# Patient Record
(demographics unavailable — no encounter records)

---

## 2024-11-16 NOTE — HISTORY OF PRESENT ILLNESS
[Home] : at home, [unfilled] , at the time of the visit. [Other Location: e.g. Home (Enter Location, City,State)___] : at [unfilled] [Verbal consent obtained from patient] : the patient, [unfilled] [FreeTextEntry8] : 55 yo female for evaluation of migraine headache. States only fioricet works, requesting prescription. states headache is worsening.

## 2024-11-16 NOTE — PLAN
[FreeTextEntry1] : Go to Emergency Room immediately for further evaluation and management in person as recommended